# Patient Record
Sex: MALE | Race: WHITE | NOT HISPANIC OR LATINO | Employment: UNEMPLOYED | URBAN - METROPOLITAN AREA
[De-identification: names, ages, dates, MRNs, and addresses within clinical notes are randomized per-mention and may not be internally consistent; named-entity substitution may affect disease eponyms.]

---

## 2022-10-17 ENCOUNTER — OFFICE VISIT (OUTPATIENT)
Dept: CARDIOLOGY CLINIC | Facility: CLINIC | Age: 32
End: 2022-10-17
Payer: COMMERCIAL

## 2022-10-17 VITALS
WEIGHT: 306 LBS | HEIGHT: 72 IN | OXYGEN SATURATION: 96 % | DIASTOLIC BLOOD PRESSURE: 98 MMHG | SYSTOLIC BLOOD PRESSURE: 142 MMHG | RESPIRATION RATE: 18 BRPM | BODY MASS INDEX: 41.45 KG/M2 | HEART RATE: 82 BPM

## 2022-10-17 DIAGNOSIS — R06.09 DYSPNEA ON EXERTION: ICD-10-CM

## 2022-10-17 DIAGNOSIS — R07.9 CHEST PAIN, UNSPECIFIED TYPE: Primary | ICD-10-CM

## 2022-10-17 PROCEDURE — 93000 ELECTROCARDIOGRAM COMPLETE: CPT | Performed by: INTERNAL MEDICINE

## 2022-10-17 PROCEDURE — 99214 OFFICE O/P EST MOD 30 MIN: CPT | Performed by: INTERNAL MEDICINE

## 2022-10-17 RX ORDER — OMEPRAZOLE 40 MG/1
CAPSULE, DELAYED RELEASE ORAL
COMMUNITY
Start: 2022-10-07

## 2022-10-17 RX ORDER — LITHIUM CARBONATE 450 MG
TABLET, EXTENDED RELEASE ORAL
COMMUNITY
Start: 2022-09-21

## 2022-10-17 RX ORDER — FLUOXETINE HYDROCHLORIDE 40 MG/1
CAPSULE ORAL
COMMUNITY
Start: 2022-10-08

## 2022-10-17 RX ORDER — MERCAPTOPURINE 50 MG/1
TABLET ORAL
COMMUNITY

## 2022-10-17 NOTE — PROGRESS NOTES
OP Consultation - Cardiology    Lincoln Perez 34 y o  male MRN: [id number]    Physician Requesting Consult: Self referral    Reason for Consult / Chief Complaint: Chest pain    HPI:     34year old male with history of sleep apnea who presents for a 2nd opinion regarding chest pain    He describes having episodes of chest pain starting approximately 3 to 4 months ago  Occurs on exertion/exercise  States when he is exercising on his stationary bike, after 7 to 10 minutes, he experiences chest pain and shortness of breath  Pain sometimes radiates to left shoulder  Patient went to the emergency room in July at an outside facility  Reports having a stress test performed where he just made target heart rate  No symptoms  He was told stress test was unremarkable  No symptoms when he is sitting or resting  Symptoms associated with shortness of breath on exertion  Has had recent fasting lipid profile an outside facility  Unfortunately, prior records are not available  Patient forgot to send them  Will send them after this visit  Denies dizziness, lightheadedness, presyncope or syncope  Denies orthopnea, PND or lower limb edema  ECG:  Normal sinus rhythm    Social History:  Tobacco:  Denies  Alcohol:  Socially    FAMILY HISTORY:    Father has heart disease  No history of sudden cardiac death in family  Father and mother has diabetes  MEDS & ALLERGIES:  No Known Allergies      Current Outpatient Medications:   •  FLUoxetine (PROzac) 40 MG capsule, , Disp: , Rfl:   •  lithium carbonate (LITHOBID) 450 mg CR tablet, , Disp: , Rfl:   •  mercaptopurine (PURINETHOL) 50 mg tablet, Take by mouth, Disp: , Rfl:   •  omeprazole (PriLOSEC) 40 MG capsule, , Disp: , Rfl:     No past medical history on file  Review of Systems:  Review of Systems   Constitutional: Negative  Negative for activity change, appetite change, chills, fatigue and fever  Respiratory: Positive for shortness of breath   Negative for chest tightness and wheezing  Cardiovascular: Positive for chest pain  Negative for palpitations and leg swelling  Gastrointestinal: Negative for nausea and vomiting  Musculoskeletal: Negative for back pain  Skin: Negative for rash  Neurological: Negative for dizziness, syncope, weakness and light-headedness  Hematological: Negative for adenopathy  Psychiatric/Behavioral: Negative for behavioral problems  All other systems reviewed and are negative  PHYSICAL EXAM:  Vitals:   Vitals:    10/17/22 0858   BP: 142/98   BP Location: Left arm   Patient Position: Sitting   Cuff Size: Standard   Pulse: 82   Resp: 18   SpO2: 96%   Weight: (!) 139 kg (306 lb)   Height: 6' (1 829 m)        Physical Exam:  Physical Exam  Vitals and nursing note reviewed  Constitutional:       General: He is not in acute distress  Appearance: Normal appearance  He is not ill-appearing or diaphoretic  HENT:      Head: Normocephalic and atraumatic  Eyes:      Extraocular Movements: Extraocular movements intact  Cardiovascular:      Rate and Rhythm: Normal rate and regular rhythm  Heart sounds: No murmur heard  No friction rub  No gallop  Pulmonary:      Effort: Pulmonary effort is normal  No respiratory distress  Breath sounds: Normal breath sounds  Abdominal:      General: There is no distension  Palpations: Abdomen is soft  Musculoskeletal:         General: No swelling  Normal range of motion  Cervical back: Normal range of motion  Skin:     General: Skin is warm and dry  Capillary Refill: Capillary refill takes less than 2 seconds  Coloration: Skin is not pale  Neurological:      General: No focal deficit present  Mental Status: He is alert and oriented to person, place, and time  Cranial Nerves: No cranial nerve deficit     Psychiatric:         Mood and Affect: Mood normal          LABORATORY RESULTS:    No results found for: WBC, HGB, HCT, MCV, PLT  No results found for: GLUCOSE, CALCIUM, NA, K, CO2, CL, BUN, CREATININE  No results found for: HGBA1C    Lipid Profile:   No results found for: CHOL  No results found for: HDL  No results found for: LDLCALC  No results found for: TRIG    The ASCVD Risk score (Nora Hand et al , 2013) failed to calculate for the following reasons: The 2013 ASCVD risk score is only valid for ages 36 to 78    Imaging: I have personally reviewed pertinent reports  No results found  EKG reviewed personally: NSR    Assessment and Plan:    Ishmael Cheadle was seen today for new patient visit  Diagnoses and all orders for this visit:    Chest pain, unspecified type           1  Chest pain  2  Shortness of breath  3  CITLALLI  4  Obesity with BMI 41    Patient presents with chest pain  Pain has been going on for the past 4 months  On exercise  No symptoms at rest   He describes having a regular exercise stress test in July which was unremarkable  However, he had no symptoms and just reached target heart rate  Will check an exercise nuclear stress test for further evaluation of underlying coronary artery disease  Will check an echocardiogram to rule out structural heart disease/valvular heart disease  Patient will return after completion of above-mentioned testing  Recommend patient presents to the emergency room or call our office if he has any new/persistent or progressive symptoms      Return to clinic in 6 weeks or PRN    Jacinto Arriaga MD  10/17/2022,9:17 AM

## 2022-11-08 ENCOUNTER — APPOINTMENT (OUTPATIENT)
Dept: NON INVASIVE DIAGNOSTICS | Facility: CLINIC | Age: 32
End: 2022-11-08

## 2022-11-08 ENCOUNTER — HOSPITAL ENCOUNTER (OUTPATIENT)
Dept: NON INVASIVE DIAGNOSTICS | Facility: CLINIC | Age: 32
End: 2022-11-08

## 2022-11-08 ENCOUNTER — HOSPITAL ENCOUNTER (OUTPATIENT)
Dept: NON INVASIVE DIAGNOSTICS | Facility: CLINIC | Age: 32
Discharge: HOME | End: 2022-11-08

## 2022-11-08 VITALS
BODY MASS INDEX: 41.45 KG/M2 | HEART RATE: 97 BPM | OXYGEN SATURATION: 97 % | HEIGHT: 72 IN | WEIGHT: 306 LBS | DIASTOLIC BLOOD PRESSURE: 106 MMHG | SYSTOLIC BLOOD PRESSURE: 146 MMHG

## 2022-11-08 VITALS
BODY MASS INDEX: 41.45 KG/M2 | HEIGHT: 72 IN | SYSTOLIC BLOOD PRESSURE: 142 MMHG | HEART RATE: 82 BPM | WEIGHT: 306 LBS | DIASTOLIC BLOOD PRESSURE: 98 MMHG

## 2022-11-08 DIAGNOSIS — R07.9 CHEST PAIN, UNSPECIFIED TYPE: ICD-10-CM

## 2022-11-08 DIAGNOSIS — R06.09 DYSPNEA ON EXERTION: ICD-10-CM

## 2022-11-08 LAB
AORTIC ROOT: 3.4 CM
APICAL FOUR CHAMBER EJECTION FRACTION: 51 %
ASCENDING AORTA: 3.3 CM
BASELINE ST DEPRESSION: 0 MM
CHEST PAIN STATEMENT: NORMAL
E WAVE DECELERATION TIME: 210 MS
FRACTIONAL SHORTENING: 30 % (ref 28–44)
INTERVENTRICULAR SEPTUM IN DIASTOLE (PARASTERNAL SHORT AXIS VIEW): 0.9 CM
INTERVENTRICULAR SEPTUM: 0.9 CM (ref 0.6–1.1)
LAAS-AP2: 16.3 CM2
LAAS-AP4: 12.8 CM2
LEFT ATRIUM AREA SYSTOLE SINGLE PLANE A4C: 13.5 CM2
LEFT ATRIUM SIZE: 4 CM
LEFT INTERNAL DIMENSION IN SYSTOLE: 3.5 CM (ref 2.1–4)
LEFT VENTRICULAR INTERNAL DIMENSION IN DIASTOLE: 5 CM (ref 3.5–6)
LEFT VENTRICULAR POSTERIOR WALL IN END DIASTOLE: 0.9 CM
LEFT VENTRICULAR STROKE VOLUME: 68 ML
LVSV (TEICH): 68 ML
MAX DIASTOLIC BP: 96 MMHG
MAX HEART RATE: 179 BPM
MAX HR PERCENT: 96 %
MAX HR: 179 BPM
MAX PREDICTED HEART RATE: 187 BPM
MAX. SYSTOLIC BP: 208 MMHG
MV E'TISSUE VEL-SEP: 8 CM/S
MV PEAK A VEL: 0.61 M/S
MV PEAK E VEL: 67 CM/S
MV STENOSIS PRESSURE HALF TIME: 61 MS
MV VALVE AREA P 1/2 METHOD: 3.61 CM2
NUC STRESS DIASTOLIC VOLUME INDEX: 104 ML/M2
NUC STRESS EJECTION FRACTION: 55 %
NUC STRESS SYSTOLIC VOLUME INDEX: 47 ML/M2
PROTOCOL NAME: NORMAL
RATE PRESSURE PRODUCT: NORMAL
RIGHT ATRIAL 2D VOLUME: 45 ML
RIGHT ATRIUM AREA SYSTOLE A4C: 13.1 CM2
RIGHT VENTRICLE ID DIMENSION: 3 CM
SL CV LEFT ATRIUM LENGTH A2C: 5 CM
SL CV LV EF: 50
SL CV PED ECHO LEFT VENTRICLE DIASTOLIC VOLUME (MOD BIPLANE) 2D: 118 ML
SL CV PED ECHO LEFT VENTRICLE SYSTOLIC VOLUME (MOD BIPLANE) 2D: 50 ML
SL CV REST NUCLEAR ISOTOPE DOSE: 15.97 MCI
SL CV STRESS NUCLEAR ISOTOPE DOSE: 48.9 MCI
SL CV STRESS RECOVERY BP: NORMAL MMHG
SL CV STRESS RECOVERY HR: 115 BPM
SL CV STRESS RECOVERY O2 SAT: 97 %
SL CV STRESS STAGE REACHED: 3
STRESS ANGINA INDEX: 0
STRESS BASELINE BP: NORMAL MMHG
STRESS BASELINE HR: 77 BPM
STRESS DUKE TREADMILL SCORE: 8
STRESS O2 SAT REST: 97 %
STRESS PEAK HR: 179 BPM
STRESS POST ESTIMATED WORKLOAD: 10.1 METS
STRESS POST EXERCISE DUR MIN: 8 MIN
STRESS POST O2 SAT PEAK: 98 %
STRESS POST PEAK BP: 208 MMHG
STRESS ST DEPRESSION: 0 MM
STRESS/REST PERFUSION RATIO: 0.85
TARGET HR FORMULA: NORMAL
TIME IN EXERCISE PHASE: NORMAL

## 2022-11-08 RX ADMIN — PERFLUTREN 0.6 ML/MIN: 6.52 INJECTION, SUSPENSION INTRAVENOUS at 08:15

## 2022-11-29 ENCOUNTER — PREP FOR PROCEDURE (OUTPATIENT)
Dept: CARDIOLOGY CLINIC | Facility: CLINIC | Age: 32
End: 2022-11-29

## 2022-11-29 ENCOUNTER — OFFICE VISIT (OUTPATIENT)
Dept: CARDIOLOGY CLINIC | Facility: CLINIC | Age: 32
End: 2022-11-29

## 2022-11-29 VITALS
HEART RATE: 81 BPM | DIASTOLIC BLOOD PRESSURE: 100 MMHG | SYSTOLIC BLOOD PRESSURE: 148 MMHG | HEIGHT: 72 IN | BODY MASS INDEX: 41.45 KG/M2 | RESPIRATION RATE: 16 BRPM | WEIGHT: 306 LBS | OXYGEN SATURATION: 97 %

## 2022-11-29 DIAGNOSIS — I10 ESSENTIAL HYPERTENSION: ICD-10-CM

## 2022-11-29 DIAGNOSIS — R06.09 DYSPNEA ON EXERTION: ICD-10-CM

## 2022-11-29 DIAGNOSIS — E66.01 MORBID OBESITY DUE TO EXCESS CALORIES (HCC): ICD-10-CM

## 2022-11-29 DIAGNOSIS — R07.9 CHEST PAIN, UNSPECIFIED TYPE: Primary | ICD-10-CM

## 2022-11-29 NOTE — PROGRESS NOTES
CARDIOLOGY OFFICE VISIT  [facility]  [address], [facility] [place]  Tel: [phone]      NAME: Talon Lyle  AGE: 34 y o  SEX: male  : 1990  MRN: [id number]      Chief Complaint:  Chief Complaint   Patient presents with   • Follow-up         History of Present Illness:   Patient comes for follow up  States he continues to get chest pressure in his mid chest and it occasionally radiates to the left shoulder whenever he goes on the exercise bike  It happens all the time and he has to stop exercising or the pain keeps getting worse  Also starts getting shortness of breath  Pt denies palpitations, lightheadedness, syncope, swelling feet, orthopnea, PND, claudication  This has been going on for last few months preventing him from exercising  He had a stress test in [place] a few months ago and again here and both of them have been normal  But pt keeps getting chest pains of increasing severity  Essential hypertension -  Has been hypertensive for many years  Taking metoprolol (dose ?) regularly  Denies lightheadedness, headache, medication side effects        Morbid obesity - states he wants to lose weight by exercising but the chest pain is not limiting him an exercise      Past Medical History:  HTN      Family History:  Father - cardiomyopathy      Social History:  Social History     Socioeconomic History   • Marital status: Single     Spouse name: None   • Number of children: None   • Years of education: None   • Highest education level: None   Occupational History   • None   Tobacco Use   • Smoking status: Never   • Smokeless tobacco: Never   Substance and Sexual Activity   • Alcohol use: Not Currently     Comment: occ   • Drug use: Never   • Sexual activity: Not Currently     Partners: Female   Other Topics Concern   • None   Social History Narrative   • None     Social Determinants of Health     Financial Resource Strain: Not on file   Food Insecurity: Not on file   Transportation Needs: Not on file   Physical Activity: Not on file   Stress: Not on file   Social Connections: Not on file   Intimate Partner Violence: Not on file   Housing Stability: Not on file         Active Problems:  Patient Active Problem List   Diagnosis   • Chest pain         The following portions of the patient's history were reviewed and updated as appropriate: past medical history, past surgical history, past family history,  past social history, current medications, allergies and problem list       Review of Systems:  Constitutional: Denies fever, chills  Eyes: Denies eye redness, eye discharge  ENT: Denies hearing loss, sneezing, nasal discharge, sore throat   Respiratory: Denies cough, expectoration  +shortness of breath  Cardiovascular: Denies palpitations, lower extremity swelling  +CP  Gastrointestinal: Denies abdominal pain, nausea, vomiting, diarrhea  Genito-Urinary: Denies dysuria, incontinence  Musculoskeletal: Denies back pain, joint pain, muscle pain  Neurologic: Denies lightheadedness, syncope, headache, seizures  Endocrine: Denies polydipsia, temperature intolerance  Allergy and Immunology: Denies hives, insect bite sensitivity  Hematological and Lymphatic: Denies bleeding problems, swollen glands   Psychological: Denies depression, suicidal ideation, anxiety, panic  Dermatological: Denies pruritus, rash, skin lesion changes      Vitals:  Vitals:    11/29/22 1055   BP: 148/100   Pulse: 81   Resp: 16   SpO2: 97%       Body mass index is 41 5 kg/m²  Weight (last 2 days)     Date/Time Weight    11/29/22 1055 139 (306)            Physical Examination:  General: Patient is not in acute distress  Awake, alert, oriented in time, place and person  Responding to commands  Morbidly obese  Head: Normocephalic  Atraumatic  Eyes: Both pupils normal sized, round and reactive to light   Nonicteric  ENT: Normal external ear canals  Neck: Supple  JVP not raised  Trachea central  No thyromegaly  Lungs: Bilateral bronchovascular breath sounds with no crackles or rhonchi  Chest wall: No tenderness  Cardiovascular: RRR  S1 and S2 normal  No murmur, rub or gallop  Gastrointestinal: Abdomen soft, nontender  No guarding or rigidity  Liver and spleen not palpable  Bowel sounds present  Neurologic: Patient is awake, alert, oriented in time, place and person  Responding to commands  Moving all extremities  Integumentary:  No skin rash  Lymphatic: No cervical lymphadenopathy  Back: Symmetric  No CVA tenderness  Extremities: No clubbing, cyanosis or edema      Cardiac testing:   Results for orders placed during the hospital encounter of 11/08/22    Echo complete w/ contrast if indicated    Interpretation Summary  •  Left Ventricle: Left ventricular cavity size is normal  Wall thickness is normal  The left ventricular ejection fraction is estimated at 50%  Systolic function is low normal   Calculated LVEF 51%  Although no diagnostic regional wall motion abnormality was identified, this possibility cannot be completely excluded on the basis of this study  Diastolic function is normal         Results for orders placed during the hospital encounter of 11/08/22    NM myocardial perfusion spect (stress and/or rest)    Interpretation Summary  •  Stress ECG: No ST deviation is noted  The ECG was negative for ischemia  •  Perfusion Defect Conclusion: There is no evidence of transient ischemic dilation (TID)  TID index 0 85  •  Perfusion: There are no perfusion defects  •  Stress Function: Left ventricular function post-stress is normal  Post-stress ejection fraction is 55 %    •  Stress Combined Conclusion: Left ventricular perfusion is probably normal       Medications:    Current Outpatient Medications:   •  FLUoxetine (PROzac) 40 MG capsule, , Disp: , Rfl:   •  lithium carbonate (LITHOBID) 450 mg CR tablet, , Disp: , Rfl:   •  mercaptopurine (PURINETHOL) 50 mg tablet, Take by mouth, Disp: , Rfl:   •  omeprazole (PriLOSEC) 40 MG capsule, , Disp: , Rfl:       Allergies:  No Known Allergies      Assessment and Plan:  1  Chest pain, unspecified type  2  Dyspnea on exertion    Patient continues to have exertional chest pain and exertional dyspnea despite 2 normal stress tests  Cardiac catheterization offered  Procedure including risks benefits alternatives discussed  Questions answered  Patient is willing  3  Essential hypertension  Patient states his BP is high because he was worried to come see a new cardiologist   Otherwise it is usually normal   He takes metoprolol, dose he does not remember    4  Morbid obesity due to excess calories ([address] )  States he wants to lose weight by exercising but currently is unable to exercise due to the chest pain and YEAGER    Recommend aggressive risk factor modification and therapeutic lifestyle changes  Low-salt, low-calorie, low-fat, low-cholesterol diet with regular exercise and to optimize weight  I will defer the ordering and monitoring of necessity lab studies to you, but I am available and happy to review and manage any of the data at your request in the future  Discussed concepts of atherosclerosis, including signs and symptoms of cardiac disease  Previous studies were reviewed  Safety measures were reviewed  Questions were entertained and answered  Patient was advised to report any problems requiring medical attention  Follow-up with PCP and appropriate specialist and lab work as discussed  Return for follow up visit as scheduled or earlier, if needed  Thank you for allowing me to participate in the care and evaluation of your patient  Should you have any questions, please feel free to contact me        Sergei Cabrera MD  11/29/2022,11:26 AM

## 2022-11-30 ENCOUNTER — TELEPHONE (OUTPATIENT)
Dept: CARDIOLOGY CLINIC | Facility: CLINIC | Age: 32
End: 2022-11-30

## 2022-11-30 NOTE — TELEPHONE ENCOUNTER
----- Message from Erika Perez MD sent at 11/29/2022 11:33 AM EST -----  Patient needs cardiac catheterization due to continued chest pain despite normal stress test  Thanks

## 2022-12-02 ENCOUNTER — TELEPHONE (OUTPATIENT)
Dept: CARDIOLOGY CLINIC | Facility: CLINIC | Age: 32
End: 2022-12-02

## 2022-12-02 ENCOUNTER — PREP FOR PROCEDURE (OUTPATIENT)
Dept: CARDIOLOGY CLINIC | Facility: CLINIC | Age: 32
End: 2022-12-02

## 2022-12-06 ENCOUNTER — APPOINTMENT (OUTPATIENT)
Dept: LAB | Facility: CLINIC | Age: 32
End: 2022-12-06

## 2022-12-06 DIAGNOSIS — R07.9 CHEST PAIN, UNSPECIFIED TYPE: ICD-10-CM

## 2022-12-06 DIAGNOSIS — R07.9 CHEST PAIN: Primary | ICD-10-CM

## 2022-12-06 LAB
ANION GAP SERPL CALCULATED.3IONS-SCNC: 5 MMOL/L (ref 4–13)
BUN SERPL-MCNC: 12 MG/DL (ref 5–25)
CALCIUM SERPL-MCNC: 9.3 MG/DL (ref 8.3–10.1)
CHLORIDE SERPL-SCNC: 110 MMOL/L (ref 96–108)
CO2 SERPL-SCNC: 27 MMOL/L (ref 21–32)
CREAT SERPL-MCNC: 1.28 MG/DL (ref 0.6–1.3)
ERYTHROCYTE [DISTWIDTH] IN BLOOD BY AUTOMATED COUNT: 13.2 % (ref 11.6–15.1)
GFR SERPL CREATININE-BSD FRML MDRD: 73 ML/MIN/1.73SQ M
GLUCOSE P FAST SERPL-MCNC: 104 MG/DL (ref 65–99)
HCT VFR BLD AUTO: 49.8 % (ref 36.5–49.3)
HGB BLD-MCNC: 16.3 G/DL (ref 12–17)
INR PPP: 0.95 (ref 0.84–1.19)
MCH RBC QN AUTO: 30.1 PG (ref 26.8–34.3)
MCHC RBC AUTO-ENTMCNC: 32.7 G/DL (ref 31.4–37.4)
MCV RBC AUTO: 92 FL (ref 82–98)
PLATELET # BLD AUTO: 348 THOUSANDS/UL (ref 149–390)
PMV BLD AUTO: 10.1 FL (ref 8.9–12.7)
POTASSIUM SERPL-SCNC: 4.3 MMOL/L (ref 3.5–5.3)
PROTHROMBIN TIME: 12.9 SECONDS (ref 11.6–14.5)
RBC # BLD AUTO: 5.41 MILLION/UL (ref 3.88–5.62)
SODIUM SERPL-SCNC: 142 MMOL/L (ref 135–147)
WBC # BLD AUTO: 9.79 THOUSAND/UL (ref 4.31–10.16)

## 2023-01-06 ENCOUNTER — OFFICE VISIT (OUTPATIENT)
Dept: CARDIOLOGY CLINIC | Facility: CLINIC | Age: 33
End: 2023-01-06

## 2023-01-06 VITALS
WEIGHT: 309 LBS | OXYGEN SATURATION: 99 % | DIASTOLIC BLOOD PRESSURE: 100 MMHG | SYSTOLIC BLOOD PRESSURE: 130 MMHG | RESPIRATION RATE: 16 BRPM | BODY MASS INDEX: 41.85 KG/M2 | HEART RATE: 98 BPM | HEIGHT: 72 IN

## 2023-01-06 DIAGNOSIS — R07.9 CHEST PAIN, UNSPECIFIED TYPE: Primary | ICD-10-CM

## 2023-01-06 DIAGNOSIS — E66.01 MORBID OBESITY DUE TO EXCESS CALORIES (HCC): ICD-10-CM

## 2023-01-06 DIAGNOSIS — I10 ESSENTIAL HYPERTENSION: ICD-10-CM

## 2023-01-06 DIAGNOSIS — R06.09 DYSPNEA ON EXERTION: ICD-10-CM

## 2023-01-06 RX ORDER — AMLODIPINE BESYLATE 2.5 MG/1
2.5 TABLET ORAL DAILY
Qty: 30 TABLET | Refills: 3 | Status: SHIPPED | OUTPATIENT
Start: 2023-01-06

## 2023-01-06 RX ORDER — ASPIRIN 81 MG/1
81 TABLET, CHEWABLE ORAL DAILY
Start: 2023-01-06

## 2023-01-06 NOTE — H&P (VIEW-ONLY)
CARDIOLOGY OFFICE VISIT  [facility]  [address], [address] [place], [place] [address]  Tel: [phone]      NAME: Nelson Chen  AGE: 34 y o  SEX: male  : 1990  MRN: [id number]      Chief Complaint:  Chief Complaint   Patient presents with   • Follow-up         History of Present Illness:   Patient comes for follow up  States he continues to get chest pressure in his mid chest and it occasionally radiates to the left shoulder whenever he goes on the exercise bike  It happens all the time and he has to stop exercising or the pain keeps getting worse  He also starts getting short of breath  Patient denies palpitations, lightheadedness, syncope, swelling feet, orthopnea, PND, claudication  This has been going on for the last few months preventing him from exercising  Patient's stress test was normal but he keeps getting chest pains of increasing severity  Essential hypertension -  Has been hypertensive for many years  Taking medications regularly  Denies lightheadedness, headache, medication side effects  Morbid Obesity -  Trying to lose weight        Past Medical History:  HTN      Family History:  Father-cardiomyopathy      Social History:  Social History     Socioeconomic History   • Marital status: Single     Spouse name: None   • Number of children: None   • Years of education: None   • Highest education level: None   Occupational History   • None   Tobacco Use   • Smoking status: Never   • Smokeless tobacco: Never   Substance and Sexual Activity   • Alcohol use: Not Currently     Comment: occ   • Drug use: Never   • Sexual activity: Not Currently     Partners: Female   Other Topics Concern   • None   Social History Narrative   • None     Social Determinants of Health     Financial Resource Strain: Not on file   Food Insecurity: Not on file   Transportation Needs: Not on file   Physical Activity: Not on file   Stress: Not on file   Social Connections: Not on file   Intimate Partner Violence: Not on file   Housing Stability: Not on file         Active Problems:  Patient Active Problem List   Diagnosis   • Chest pain         The following portions of the patient's history were reviewed and updated as appropriate: past medical history, past surgical history, past family history,  past social history, current medications, allergies and problem list       Review of Systems:  Constitutional: Denies fever, chills  Eyes: Denies eye redness, eye discharge  ENT: Denies hearing loss, sneezing, nasal discharge, sore throat   Respiratory: Denies cough, expectoration  +shortness of breath  Cardiovascular: Denies palpitations, lower extremity swelling  +CP  Gastrointestinal: Denies abdominal pain, nausea, vomiting, diarrhea  Genito-Urinary: Denies dysuria, incontinence  Musculoskeletal: Denies back pain, joint pain, muscle pain  Neurologic: Denies lightheadedness, syncope, headache, seizures  Endocrine: Denies polydipsia, temperature intolerance  Allergy and Immunology: Denies hives, insect bite sensitivity  Hematological and Lymphatic: Denies bleeding problems, swollen glands   Psychological: Denies depression, suicidal ideation, anxiety, panic  Dermatological: Denies pruritus, rash, skin lesion changes      Vitals:  Vitals:    01/06/23 1042   BP: 130/100   Pulse: 98   Resp: 16   SpO2: 99%       Body mass index is 41 91 kg/m²  Weight (last 2 days)     Date/Time Weight    01/06/23 1042 140 (309)            Physical Examination:  General: Patient is not in acute distress  Awake, alert, oriented in time, place and person  Responding to commands  Morbidly obese  Head: Normocephalic  Atraumatic  Eyes: Both pupils normal sized, round and reactive to light  Nonicteric  ENT: Normal external ear canals  Neck: Supple  JVP not raised   Trachea central  No thyromegaly  Lungs: Bilateral bronchovascular breath sounds with no crackles or rhonchi  Chest wall: No tenderness  Cardiovascular: RRR  S1 and S2 normal  No murmur, rub or gallop  Gastrointestinal: Abdomen soft, nontender  No guarding or rigidity  Liver and spleen not palpable  Bowel sounds present  Neurologic: Patient is awake, alert, oriented in time, place and person  Responding to commands  Moving all extremities  Integumentary:  No skin rash  Lymphatic: No cervical lymphadenopathy  Back: Symmetric  No CVA tenderness  Extremities: No clubbing, cyanosis or edema      Laboratory Results:  CBC with diff:   Lab Results   Component Value Date    WBC 9 79 12/06/2022    RBC 5 41 12/06/2022    HGB 16 3 12/06/2022    HCT 49 8 (H) 12/06/2022    MCV 92 12/06/2022    MCH 30 1 12/06/2022    RDW 13 2 12/06/2022     12/06/2022       CMP:  Lab Results   Component Value Date    CREATININE 1 28 12/06/2022    BUN 12 12/06/2022    K 4 3 12/06/2022     (H) 12/06/2022    CO2 27 12/06/2022       Cardiac testing:   Results for orders placed during the hospital encounter of 11/08/22    Echo complete w/ contrast if indicated    Interpretation Summary  •  Left Ventricle: Left ventricular cavity size is normal  Wall thickness is normal  The left ventricular ejection fraction is estimated at 50%  Systolic function is low normal   Calculated LVEF 51%  Although no diagnostic regional wall motion abnormality was identified, this possibility cannot be completely excluded on the basis of this study  Diastolic function is normal       Results for orders placed during the hospital encounter of 11/08/22    NM myocardial perfusion spect (stress and/or rest)    Interpretation Summary  •  Stress ECG: No ST deviation is noted  The ECG was negative for ischemia  •  Perfusion Defect Conclusion: There is no evidence of transient ischemic dilation (TID)  TID index 0 85  •  Perfusion: There are no perfusion defects  •  Stress Function: Left ventricular function post-stress is normal  Post-stress ejection fraction is 55 %    • Stress Combined Conclusion: Left ventricular perfusion is probably normal       Medications:    Current Outpatient Medications:   •  FLUoxetine (PROzac) 40 MG capsule, , Disp: , Rfl:   •  lithium carbonate (LITHOBID) 450 mg CR tablet, , Disp: , Rfl:   •  mercaptopurine (PURINETHOL) 50 mg tablet, Take by mouth, Disp: , Rfl:   •  omeprazole (PriLOSEC) 40 MG capsule, , Disp: , Rfl:       Allergies:  No Known Allergies      Assessment and Plan:  1  Chest pain, unspecified type  2  Dyspnea on exertion    Pt continues to have exertional chest pain and exertional dyspnea despite normal stress test   Cardiac catheterization recommended  Procedure including risk benefits alternatives discussed  Questions answered  Patient is willing to get it done  Start aspirin 81 mg daily    3  Essential hypertension  Diastolic BP elevated  Start Amlodipine 2 5 mg daily    4  Morbid obesity due to excess calories (HCC)  Try to lose weight    Recommend aggressive risk factor modification and therapeutic lifestyle changes  Low-salt, low-calorie, low-fat, low-cholesterol diet with regular exercise and to optimize weight  I will defer the ordering and monitoring of necessity lab studies to you, but I am available and happy to review and manage any of the data at your request in the future  Discussed concepts of atherosclerosis, including signs and symptoms of cardiac disease  Previous studies were reviewed  Safety measures were reviewed  Questions were entertained and answered  Patient was advised to report any problems requiring medical attention  Follow-up with PCP and appropriate specialist and lab work as discussed  Return for follow up visit as scheduled or earlier, if needed  Thank you for allowing me to participate in the care and evaluation of your patient  Should you have any questions, please feel free to contact me        Chad Rivera MD  1/6/2023,11:03 AM

## 2023-01-06 NOTE — PROGRESS NOTES
CARDIOLOGY OFFICE VISIT  [facility]  [address], [address] [place], [place] [address]kayla Melgar  Tel: [phone]      NAME: Layo De Paz  AGE: 34 y o  SEX: male  : 1990  MRN: [id number]      Chief Complaint:  Chief Complaint   Patient presents with   • Follow-up         History of Present Illness:   Patient comes for follow up  States he continues to get chest pressure in his mid chest and it occasionally radiates to the left shoulder whenever he goes on the exercise bike  It happens all the time and he has to stop exercising or the pain keeps getting worse  He also starts getting short of breath  Patient denies palpitations, lightheadedness, syncope, swelling feet, orthopnea, PND, claudication  This has been going on for the last few months preventing him from exercising  Patient's stress test was normal but he keeps getting chest pains of increasing severity  Essential hypertension -  Has been hypertensive for many years  Taking medications regularly  Denies lightheadedness, headache, medication side effects  Morbid Obesity -  Trying to lose weight        Past Medical History:  HTN      Family History:  Father-cardiomyopathy      Social History:  Social History     Socioeconomic History   • Marital status: Single     Spouse name: None   • Number of children: None   • Years of education: None   • Highest education level: None   Occupational History   • None   Tobacco Use   • Smoking status: Never   • Smokeless tobacco: Never   Substance and Sexual Activity   • Alcohol use: Not Currently     Comment: occ   • Drug use: Never   • Sexual activity: Not Currently     Partners: Female   Other Topics Concern   • None   Social History Narrative   • None     Social Determinants of Health     Financial Resource Strain: Not on file   Food Insecurity: Not on file   Transportation Needs: Not on file   Physical Activity: Not on file   Stress: Not on file   Social Connections: Not on file   Intimate Partner Violence: Not on file   Housing Stability: Not on file         Active Problems:  Patient Active Problem List   Diagnosis   • Chest pain         The following portions of the patient's history were reviewed and updated as appropriate: past medical history, past surgical history, past family history,  past social history, current medications, allergies and problem list       Review of Systems:  Constitutional: Denies fever, chills  Eyes: Denies eye redness, eye discharge  ENT: Denies hearing loss, sneezing, nasal discharge, sore throat   Respiratory: Denies cough, expectoration  +shortness of breath  Cardiovascular: Denies palpitations, lower extremity swelling  +CP  Gastrointestinal: Denies abdominal pain, nausea, vomiting, diarrhea  Genito-Urinary: Denies dysuria, incontinence  Musculoskeletal: Denies back pain, joint pain, muscle pain  Neurologic: Denies lightheadedness, syncope, headache, seizures  Endocrine: Denies polydipsia, temperature intolerance  Allergy and Immunology: Denies hives, insect bite sensitivity  Hematological and Lymphatic: Denies bleeding problems, swollen glands   Psychological: Denies depression, suicidal ideation, anxiety, panic  Dermatological: Denies pruritus, rash, skin lesion changes      Vitals:  Vitals:    01/06/23 1042   BP: 130/100   Pulse: 98   Resp: 16   SpO2: 99%       Body mass index is 41 91 kg/m²  Weight (last 2 days)     Date/Time Weight    01/06/23 1042 140 (309)            Physical Examination:  General: Patient is not in acute distress  Awake, alert, oriented in time, place and person  Responding to commands  Morbidly obese  Head: Normocephalic  Atraumatic  Eyes: Both pupils normal sized, round and reactive to light  Nonicteric  ENT: Normal external ear canals  Neck: Supple  JVP not raised   Trachea central  No thyromegaly  Lungs: Bilateral bronchovascular breath sounds with no crackles or rhonchi  Chest wall: No tenderness  Cardiovascular: RRR  S1 and S2 normal  No murmur, rub or gallop  Gastrointestinal: Abdomen soft, nontender  No guarding or rigidity  Liver and spleen not palpable  Bowel sounds present  Neurologic: Patient is awake, alert, oriented in time, place and person  Responding to commands  Moving all extremities  Integumentary:  No skin rash  Lymphatic: No cervical lymphadenopathy  Back: Symmetric  No CVA tenderness  Extremities: No clubbing, cyanosis or edema      Laboratory Results:  CBC with diff:   Lab Results   Component Value Date    WBC 9 79 12/06/2022    RBC 5 41 12/06/2022    HGB 16 3 12/06/2022    HCT 49 8 (H) 12/06/2022    MCV 92 12/06/2022    MCH 30 1 12/06/2022    RDW 13 2 12/06/2022     12/06/2022       CMP:  Lab Results   Component Value Date    CREATININE 1 28 12/06/2022    BUN 12 12/06/2022    K 4 3 12/06/2022     (H) 12/06/2022    CO2 27 12/06/2022       Cardiac testing:   Results for orders placed during the hospital encounter of 11/08/22    Echo complete w/ contrast if indicated    Interpretation Summary  •  Left Ventricle: Left ventricular cavity size is normal  Wall thickness is normal  The left ventricular ejection fraction is estimated at 50%  Systolic function is low normal   Calculated LVEF 51%  Although no diagnostic regional wall motion abnormality was identified, this possibility cannot be completely excluded on the basis of this study  Diastolic function is normal       Results for orders placed during the hospital encounter of 11/08/22    NM myocardial perfusion spect (stress and/or rest)    Interpretation Summary  •  Stress ECG: No ST deviation is noted  The ECG was negative for ischemia  •  Perfusion Defect Conclusion: There is no evidence of transient ischemic dilation (TID)  TID index 0 85  •  Perfusion: There are no perfusion defects  •  Stress Function: Left ventricular function post-stress is normal  Post-stress ejection fraction is 55 %    • Stress Combined Conclusion: Left ventricular perfusion is probably normal       Medications:    Current Outpatient Medications:   •  FLUoxetine (PROzac) 40 MG capsule, , Disp: , Rfl:   •  lithium carbonate (LITHOBID) 450 mg CR tablet, , Disp: , Rfl:   •  mercaptopurine (PURINETHOL) 50 mg tablet, Take by mouth, Disp: , Rfl:   •  omeprazole (PriLOSEC) 40 MG capsule, , Disp: , Rfl:       Allergies:  No Known Allergies      Assessment and Plan:  1  Chest pain, unspecified type  2  Dyspnea on exertion    Pt continues to have exertional chest pain and exertional dyspnea despite normal stress test   Cardiac catheterization recommended  Procedure including risk benefits alternatives discussed  Questions answered  Patient is willing to get it done  Start aspirin 81 mg daily    3  Essential hypertension  Diastolic BP elevated  Start Amlodipine 2 5 mg daily    4  Morbid obesity due to excess calories (HCC)  Try to lose weight    Recommend aggressive risk factor modification and therapeutic lifestyle changes  Low-salt, low-calorie, low-fat, low-cholesterol diet with regular exercise and to optimize weight  I will defer the ordering and monitoring of necessity lab studies to you, but I am available and happy to review and manage any of the data at your request in the future  Discussed concepts of atherosclerosis, including signs and symptoms of cardiac disease  Previous studies were reviewed  Safety measures were reviewed  Questions were entertained and answered  Patient was advised to report any problems requiring medical attention  Follow-up with PCP and appropriate specialist and lab work as discussed  Return for follow up visit as scheduled or earlier, if needed  Thank you for allowing me to participate in the care and evaluation of your patient  Should you have any questions, please feel free to contact me        Zenaida Amaro MD  1/6/2023,11:03 AM

## 2023-01-12 ENCOUNTER — TELEPHONE (OUTPATIENT)
Dept: CARDIOLOGY CLINIC | Facility: CLINIC | Age: 33
End: 2023-01-12

## 2023-01-12 NOTE — TELEPHONE ENCOUNTER
Pt called and would like to know the status of scheduling his CATH. Pt is requesting a call back at [phone]

## 2023-01-13 NOTE — TELEPHONE ENCOUNTER
Pt called & I advised him of message regarding auth & we will give pt a call back when we received auth..

## 2023-01-13 NOTE — TELEPHONE ENCOUNTER
Lmom advising pt we are currently in the process of sending a request to new insurance to check if auth is needed. If it is we will request the auth and call pt once we have received a final discission  regarding his cath.

## 2023-01-16 DIAGNOSIS — R07.9 CHEST PAIN, UNSPECIFIED TYPE: Primary | ICD-10-CM

## 2023-01-17 ENCOUNTER — TELEPHONE (OUTPATIENT)
Dept: RADIOLOGY | Facility: HOSPITAL | Age: 33
End: 2023-01-17

## 2023-01-17 NOTE — NURSING NOTE
Received from cardiologist on 1/16/23 via in remigio Ruiz MD  [address] [place] [address], RN  Cath approved   Can cancel CTA   Thanks   Referring to scheduled CTA on 1/26/22 @ 12:30

## 2023-01-18 ENCOUNTER — APPOINTMENT (OUTPATIENT)
Dept: LAB | Facility: CLINIC | Age: 33
End: 2023-01-18

## 2023-01-18 LAB
ANION GAP SERPL CALCULATED.3IONS-SCNC: 6 MMOL/L (ref 4–13)
BASOPHILS # BLD AUTO: 0.04 THOUSANDS/ÂΜL (ref 0–0.1)
BASOPHILS NFR BLD AUTO: 1 % (ref 0–1)
BUN SERPL-MCNC: 14 MG/DL (ref 5–25)
CALCIUM SERPL-MCNC: 9.1 MG/DL (ref 8.3–10.1)
CHLORIDE SERPL-SCNC: 108 MMOL/L (ref 96–108)
CO2 SERPL-SCNC: 27 MMOL/L (ref 21–32)
CREAT SERPL-MCNC: 1.25 MG/DL (ref 0.6–1.3)
EOSINOPHIL # BLD AUTO: 0.18 THOUSAND/ÂΜL (ref 0–0.61)
EOSINOPHIL NFR BLD AUTO: 2 % (ref 0–6)
ERYTHROCYTE [DISTWIDTH] IN BLOOD BY AUTOMATED COUNT: 12.9 % (ref 11.6–15.1)
GFR SERPL CREATININE-BSD FRML MDRD: 75 ML/MIN/1.73SQ M
GLUCOSE P FAST SERPL-MCNC: 99 MG/DL (ref 65–99)
HCT VFR BLD AUTO: 50.2 % (ref 36.5–49.3)
HGB BLD-MCNC: 16.8 G/DL (ref 12–17)
IMM GRANULOCYTES # BLD AUTO: 0.03 THOUSAND/UL (ref 0–0.2)
IMM GRANULOCYTES NFR BLD AUTO: 0 % (ref 0–2)
INR PPP: 0.94 (ref 0.84–1.19)
LYMPHOCYTES # BLD AUTO: 2.93 THOUSANDS/ÂΜL (ref 0.6–4.47)
LYMPHOCYTES NFR BLD AUTO: 36 % (ref 14–44)
MCH RBC QN AUTO: 30 PG (ref 26.8–34.3)
MCHC RBC AUTO-ENTMCNC: 33.5 G/DL (ref 31.4–37.4)
MCV RBC AUTO: 90 FL (ref 82–98)
MONOCYTES # BLD AUTO: 0.68 THOUSAND/ÂΜL (ref 0.17–1.22)
MONOCYTES NFR BLD AUTO: 8 % (ref 4–12)
NEUTROPHILS # BLD AUTO: 4.28 THOUSANDS/ÂΜL (ref 1.85–7.62)
NEUTS SEG NFR BLD AUTO: 53 % (ref 43–75)
NRBC BLD AUTO-RTO: 0 /100 WBCS
PLATELET # BLD AUTO: 341 THOUSANDS/UL (ref 149–390)
PMV BLD AUTO: 10.2 FL (ref 8.9–12.7)
POTASSIUM SERPL-SCNC: 3.9 MMOL/L (ref 3.5–5.3)
PROTHROMBIN TIME: 12.8 SECONDS (ref 11.6–14.5)
RBC # BLD AUTO: 5.6 MILLION/UL (ref 3.88–5.62)
SODIUM SERPL-SCNC: 141 MMOL/L (ref 135–147)
WBC # BLD AUTO: 8.14 THOUSAND/UL (ref 4.31–10.16)

## 2023-01-19 ENCOUNTER — TELEPHONE (OUTPATIENT)
Dept: CARDIOLOGY CLINIC | Facility: CLINIC | Age: 33
End: 2023-01-19

## 2023-01-19 NOTE — TELEPHONE ENCOUNTER
----- Message from Anne Dodge MD sent at 1/19/2023  1:28 PM EST -----  Please inform patient that the blood test was normal    ----- Message -----  From: Lab, Background User  Sent: 1/18/2023   8:05 PM EST  To: Anne Dodge MD

## 2023-01-23 ENCOUNTER — HOSPITAL ENCOUNTER (OUTPATIENT)
Facility: HOSPITAL | Age: 33
Setting detail: OUTPATIENT SURGERY
Discharge: HOME | End: 2023-01-23
Attending: INTERNAL MEDICINE | Admitting: INTERNAL MEDICINE

## 2023-01-23 VITALS
HEIGHT: 73 IN | SYSTOLIC BLOOD PRESSURE: 120 MMHG | RESPIRATION RATE: 17 BRPM | WEIGHT: 303.57 LBS | DIASTOLIC BLOOD PRESSURE: 74 MMHG | OXYGEN SATURATION: 96 % | HEART RATE: 87 BPM | TEMPERATURE: 98 F | BODY MASS INDEX: 40.23 KG/M2

## 2023-01-23 DIAGNOSIS — R07.9 CHEST PAIN, UNSPECIFIED TYPE: ICD-10-CM

## 2023-01-23 LAB
ANION GAP SERPL CALCULATED.3IONS-SCNC: 11 MMOL/L (ref 4–13)
BUN SERPL-MCNC: 12 MG/DL (ref 5–25)
CALCIUM SERPL-MCNC: 8.4 MG/DL (ref 8.3–10.1)
CHLORIDE SERPL-SCNC: 104 MMOL/L (ref 96–108)
CO2 SERPL-SCNC: 24 MMOL/L (ref 21–32)
CREAT SERPL-MCNC: 1.04 MG/DL (ref 0.6–1.3)
GFR SERPL CREATININE-BSD FRML MDRD: 93 ML/MIN/1.73SQ M
GLUCOSE P FAST SERPL-MCNC: 96 MG/DL (ref 65–99)
GLUCOSE SERPL-MCNC: 96 MG/DL (ref 65–140)
POTASSIUM SERPL-SCNC: 4.2 MMOL/L (ref 3.5–5.3)
SODIUM SERPL-SCNC: 139 MMOL/L (ref 135–147)

## 2023-01-23 RX ORDER — HEPARIN SODIUM 1000 [USP'U]/ML
INJECTION, SOLUTION INTRAVENOUS; SUBCUTANEOUS CODE/TRAUMA/SEDATION MEDICATION
Status: DISCONTINUED | OUTPATIENT
Start: 2023-01-23 | End: 2023-01-23 | Stop reason: HOSPADM

## 2023-01-23 RX ORDER — VERAPAMIL HYDROCHLORIDE 2.5 MG/ML
INJECTION, SOLUTION INTRAVENOUS CODE/TRAUMA/SEDATION MEDICATION
Status: DISCONTINUED | OUTPATIENT
Start: 2023-01-23 | End: 2023-01-23 | Stop reason: HOSPADM

## 2023-01-23 RX ORDER — NITROGLYCERIN 20 MG/100ML
INJECTION INTRAVENOUS CODE/TRAUMA/SEDATION MEDICATION
Status: DISCONTINUED | OUTPATIENT
Start: 2023-01-23 | End: 2023-01-23 | Stop reason: HOSPADM

## 2023-01-23 RX ORDER — LIDOCAINE HYDROCHLORIDE 10 MG/ML
INJECTION, SOLUTION EPIDURAL; INFILTRATION; INTRACAUDAL; PERINEURAL CODE/TRAUMA/SEDATION MEDICATION
Status: DISCONTINUED | OUTPATIENT
Start: 2023-01-23 | End: 2023-01-23 | Stop reason: HOSPADM

## 2023-01-23 RX ORDER — MIDAZOLAM HYDROCHLORIDE 2 MG/2ML
INJECTION, SOLUTION INTRAMUSCULAR; INTRAVENOUS CODE/TRAUMA/SEDATION MEDICATION
Status: DISCONTINUED | OUTPATIENT
Start: 2023-01-23 | End: 2023-01-23 | Stop reason: HOSPADM

## 2023-01-23 RX ORDER — FENTANYL CITRATE 50 UG/ML
INJECTION, SOLUTION INTRAMUSCULAR; INTRAVENOUS CODE/TRAUMA/SEDATION MEDICATION
Status: DISCONTINUED | OUTPATIENT
Start: 2023-01-23 | End: 2023-01-23 | Stop reason: HOSPADM

## 2023-01-23 RX ORDER — SODIUM CHLORIDE 9 MG/ML
INJECTION, SOLUTION INTRAVENOUS
Status: COMPLETED | OUTPATIENT
Start: 2023-01-23 | End: 2023-01-23

## 2023-01-23 NOTE — DISCHARGE INSTR - AVS FIRST PAGE
1  Please see the post cardiac catheterization dishcarge instructions  No heavy lifting, greater than 10 lbs  or strenuous  activity for 48 hrs  2 Remove band aid tomorrow  Shower and wash area- wrist gently with soap and water- beginning tomorrow  Rinse and pat dry  Apply new water seal band aid  Repeat this process for 5 days  No powders, creams lotions or antibiotic ointments  for 5 days  No tub baths, hot tubs or swimming for 5 days  3  Please call our office ([address]) if you have any fever, redness, swelling, discharge from your wrist access site    4 No driving for 2 days

## 2023-01-23 NOTE — Clinical Note
Defib pad site: anterior/posterior and left lower flank  Defib pad site assessment: skin integrity intact  denies pain/discomfort

## 2023-01-23 NOTE — INTERVAL H&P NOTE
Update: (This section must be completed if the H&P was completed greater than 24 hrs to procedure or admission)    H&P reviewed  After examining the patient, I find no changed to the H&P since it had been written  Continues to have chest pain and shortness of breath  I have discussed in detail with patient regarding the indications, alternatives, risks and benefit of cardiac catheterization and possible PCI  The procedure risks, benefits, and complications (including but not limited to bleeding, infection, arrhythmia, nephrotoxicity, vessel injury, myocardial infarction, CVA, and death) were reviewed  Patient is alert and oriented x3 and wishes to proceed  All questions answered      Patient re-evaluated   Accept as history and physical     Keily Power MD/January 23, 2023/8:36 AM

## 2023-01-25 LAB
ATRIAL RATE: 80 BPM
P AXIS: 52 DEGREES
PR INTERVAL: 162 MS
QRS AXIS: 13 DEGREES
QRSD INTERVAL: 94 MS
QT INTERVAL: 376 MS
QTC INTERVAL: 433 MS
T WAVE AXIS: 59 DEGREES
VENTRICULAR RATE: 80 BPM

## 2023-01-26 ENCOUNTER — HOSPITAL ENCOUNTER (OUTPATIENT)
Dept: RADIOLOGY | Facility: HOSPITAL | Age: 33
Discharge: HOME | End: 2023-01-26

## 2023-02-07 ENCOUNTER — OFFICE VISIT (OUTPATIENT)
Dept: CARDIOLOGY CLINIC | Facility: CLINIC | Age: 33
End: 2023-02-07

## 2023-02-07 VITALS
BODY MASS INDEX: 39.76 KG/M2 | RESPIRATION RATE: 16 BRPM | OXYGEN SATURATION: 98 % | HEART RATE: 81 BPM | WEIGHT: 300 LBS | SYSTOLIC BLOOD PRESSURE: 105 MMHG | DIASTOLIC BLOOD PRESSURE: 78 MMHG | HEIGHT: 73 IN

## 2023-02-07 DIAGNOSIS — E66.01 MORBID OBESITY DUE TO EXCESS CALORIES (HCC): ICD-10-CM

## 2023-02-07 DIAGNOSIS — I10 ESSENTIAL HYPERTENSION: Primary | ICD-10-CM

## 2023-02-07 NOTE — PROGRESS NOTES
CARDIOLOGY OFFICE VISIT  [facility]  [address], [address] [place], [place] [address]solis Melgar  Tel: [phone]      NAME: Sarai Mason  AGE: 34 y o  SEX: male  : 1990  MRN: [id number]      Chief Complaint:  Chief Complaint   Patient presents with   • Follow-up         History of Present Illness:   Patient comes for follow up s/p cardiac catheterization which showed no significant obstructive coronary artery disease  Pt denies chest pain / pressure, SOB, palpitations, lightheadedness, syncope, swelling feet, orthopnea, PND, claudication  Essential hypertension -  Has been hypertensive for many years  Taking medications regularly  Denies lightheadedness, headache, medication side effects  Obesity -  Trying to lose weight  Past Medical History:  Past Medical History:   Diagnosis Date   • Bipolar disorder ([facility] )    • Crohn disease ([facility] )    • Depression    • Hypertension          Past Surgical History:  Past Surgical History:   Procedure Laterality Date   • BOWEL RESECTION     • CARDIAC CATHETERIZATION Left 2023    Procedure: Cardiac Left Heart Cath;  Surgeon: Sawyer Alcocer MD;  Location: [address] [place] [address] CATH LAB; Service: Cardiology   • CARDIAC CATHETERIZATION N/A 2023    Procedure: Cardiac Coronary Angiogram;  Surgeon: Sawyer Alcocer MD;  Location: [address] [place] [address] CATH LAB; Service: Cardiology         Family History:  History reviewed  No pertinent family history        Social History:  Social History     Socioeconomic History   • Marital status: Single     Spouse name: None   • Number of children: None   • Years of education: None   • Highest education level: None   Occupational History   • None   Tobacco Use   • Smoking status: Never   • Smokeless tobacco: Never   Vaping Use   • Vaping Use: Never used   Substance and Sexual Activity   • Alcohol use: Not Currently     Comment: occ   • Drug use: Never   • Sexual activity: Not Currently     Partners: Female   Other Topics Concern   • None   Social History Narrative   • None     Social Determinants of Health     Financial Resource Strain: Not on file   Food Insecurity: Not on file   Transportation Needs: Not on file   Physical Activity: Not on file   Stress: Not on file   Social Connections: Not on file   Intimate Partner Violence: Not on file   Housing Stability: Not on file         Active Problems:  Patient Active Problem List   Diagnosis   • Chest pain         The following portions of the patient's history were reviewed and updated as appropriate: past medical history, past surgical history, past family history,  past social history, current medications, allergies and problem list       Review of Systems:  Constitutional: Denies fever, chills  Eyes: Denies eye redness, eye discharge  ENT: Denies hearing loss, sneezing, nasal discharge, sore throat   Respiratory: Denies cough, expectoration, shortness of breath  Cardiovascular: Denies chest pain, palpitations, lower extremity swelling  Gastrointestinal: Denies abdominal pain, nausea, vomiting, diarrhea  Genito-Urinary: Denies dysuria, incontinence  Musculoskeletal: Denies back pain, joint pain, muscle pain  Neurologic: Denies lightheadedness, syncope, headache, seizures  Endocrine: Denies polydipsia, temperature intolerance  Allergy and Immunology: Denies hives, insect bite sensitivity  Hematological and Lymphatic: Denies bleeding problems, swollen glands   Psychological: Denies depression, suicidal ideation, anxiety, panic  Dermatological: Denies pruritus, rash, skin lesion changes      Vitals:  Vitals:    02/07/23 1336   BP: 105/78   Pulse: 81   Resp: 16   SpO2: 98%       Body mass index is 39 58 kg/m²  Weight (last 2 days)     Date/Time Weight    02/07/23 1336 136 (300)            Physical Examination:  General: Patient is not in acute distress  Awake, alert, oriented in time, place and person   Responding to commands  Head: Normocephalic  Atraumatic  Eyes: Both pupils normal sized, round and reactive to light  Nonicteric  ENT: Normal external ear canals  Neck: Supple  JVP not raised  Trachea central  No thyromegaly  Lungs: Bilateral bronchovascular breath sounds with no crackles or rhonchi  Chest wall: No tenderness  Cardiovascular: RRR  S1 and S2 normal  No murmur, rub or gallop  Gastrointestinal: Abdomen soft, nontender  No guarding or rigidity  Liver and spleen not palpable  Bowel sounds present  Neurologic: Patient is awake, alert, oriented in time, place and person  Responding to commands  Moving all extremities  Integumentary:  No skin rash  Lymphatic: No cervical lymphadenopathy  Back: Symmetric  No CVA tenderness  Extremities: No clubbing, cyanosis or edema      Laboratory Results:  CBC with diff:   Lab Results   Component Value Date    WBC 8 14 01/18/2023    RBC 5 60 01/18/2023    HGB 16 8 01/18/2023    HCT 50 2 (H) 01/18/2023    MCV 90 01/18/2023    MCH 30 0 01/18/2023    RDW 12 9 01/18/2023     01/18/2023       CMP:  Lab Results   Component Value Date    CREATININE 1 04 01/23/2023    BUN 12 01/23/2023    K 4 2 01/23/2023     01/23/2023    CO2 24 01/23/2023       Cardiac testing:   Results for orders placed during the hospital encounter of 11/08/22    Echo complete w/ contrast if indicated    Interpretation Summary  •  Left Ventricle: Left ventricular cavity size is normal  Wall thickness is normal  The left ventricular ejection fraction is estimated at 50%  Systolic function is low normal   Calculated LVEF 51%  Although no diagnostic regional wall motion abnormality was identified, this possibility cannot be completely excluded on the basis of this study  Diastolic function is normal     Results for orders placed during the hospital encounter of 11/08/22    NM myocardial perfusion spect (stress and/or rest)    Interpretation Summary  •  Stress ECG: No ST deviation is noted  The ECG was negative for ischemia    • Perfusion Defect Conclusion: There is no evidence of transient ischemic dilation (TID)  TID index 0 85  •  Perfusion: There are no perfusion defects  •  Stress Function: Left ventricular function post-stress is normal  Post-stress ejection fraction is 55 %  •  Stress Combined Conclusion: Left ventricular perfusion is probably normal       Medications:    Current Outpatient Medications:   •  amLODIPine (NORVASC) 2 5 mg tablet, Take 1 tablet (2 5 mg total) by mouth daily, Disp: 30 tablet, Rfl: 3  •  cariprazine (VRAYLAR) 6 MG capsule, Take 6 mg by mouth daily, Disp: , Rfl:   •  FLUoxetine (PROzac) 40 MG capsule, , Disp: , Rfl:   •  lithium carbonate (LITHOBID) 450 mg CR tablet, , Disp: , Rfl:   •  mercaptopurine (PURINETHOL) 50 mg tablet, Take by mouth, Disp: , Rfl:   •  omeprazole (PriLOSEC) 40 MG capsule, , Disp: , Rfl:       Allergies:  No Known Allergies      Assessment and Plan:  1  Essential hypertension  BP stable  Continue current medications  Continue to monitor BP at home and call if abnormal    2  Morbid obesity due to excess calories ([address] )  Counseled to try to lose weight      Recommend aggressive risk factor modification and therapeutic lifestyle changes  Low-salt, low-calorie, low-fat, low-cholesterol diet with regular exercise and to optimize weight  I will defer the ordering and monitoring of necessity lab studies to you, but I am available and happy to review and manage any of the data at your request in the future  Discussed concepts of atherosclerosis, including signs and symptoms of cardiac disease  Previous studies were reviewed  Safety measures were reviewed  Questions were entertained and answered  Patient was advised to report any problems requiring medical attention  Follow-up with PCP and appropriate specialist and lab work as discussed  Return for follow up visit as scheduled or earlier, if needed    Thank you for allowing me to participate in the care and evaluation of your patient  Should you have any questions, please feel free to contact me        Kimberley Harvey MD  2/6/3484,1:56 PM
